# Patient Record
Sex: MALE | Race: WHITE | Employment: UNEMPLOYED | URBAN - METROPOLITAN AREA
[De-identification: names, ages, dates, MRNs, and addresses within clinical notes are randomized per-mention and may not be internally consistent; named-entity substitution may affect disease eponyms.]

---

## 2017-01-09 ENCOUNTER — RECORDS - HEALTHEAST (OUTPATIENT)
Dept: LAB | Facility: CLINIC | Age: 37
End: 2017-01-09

## 2017-01-09 LAB
CREAT SERPL-MCNC: 0.84 MG/DL (ref 0.7–1.3)
GFR SERPL CREATININE-BSD FRML MDRD: >60 ML/MIN/1.73M2

## 2017-01-13 ENCOUNTER — RECORDS - HEALTHEAST (OUTPATIENT)
Dept: LAB | Facility: CLINIC | Age: 37
End: 2017-01-13

## 2017-01-13 LAB
CREAT SERPL-MCNC: 0.96 MG/DL (ref 0.7–1.3)
GFR SERPL CREATININE-BSD FRML MDRD: >60 ML/MIN/1.73M2

## 2017-01-20 ENCOUNTER — RECORDS - HEALTHEAST (OUTPATIENT)
Dept: LAB | Facility: CLINIC | Age: 37
End: 2017-01-20

## 2017-01-23 LAB
CREAT SERPL-MCNC: 0.94 MG/DL (ref 0.7–1.3)
GFR SERPL CREATININE-BSD FRML MDRD: >60 ML/MIN/1.73M2

## 2017-01-24 ENCOUNTER — RECORDS - HEALTHEAST (OUTPATIENT)
Dept: LAB | Facility: CLINIC | Age: 37
End: 2017-01-24

## 2017-01-24 LAB
CREAT SERPL-MCNC: 1.28 MG/DL (ref 0.7–1.3)
GFR SERPL CREATININE-BSD FRML MDRD: >60 ML/MIN/1.73M2

## 2017-02-04 ENCOUNTER — RECORDS - HEALTHEAST (OUTPATIENT)
Dept: LAB | Facility: CLINIC | Age: 37
End: 2017-02-04

## 2017-02-04 LAB
AST SERPL W P-5'-P-CCNC: 42 U/L (ref 0–40)
CREAT SERPL-MCNC: 0.95 MG/DL (ref 0.7–1.3)
GFR SERPL CREATININE-BSD FRML MDRD: >60 ML/MIN/1.73M2

## 2017-02-08 ENCOUNTER — RECORDS - HEALTHEAST (OUTPATIENT)
Dept: LAB | Facility: CLINIC | Age: 37
End: 2017-02-08

## 2017-02-09 LAB
CREAT SERPL-MCNC: 0.9 MG/DL (ref 0.7–1.3)
GFR SERPL CREATININE-BSD FRML MDRD: >60 ML/MIN/1.73M2

## 2017-02-10 ENCOUNTER — RECORDS - HEALTHEAST (OUTPATIENT)
Dept: LAB | Facility: CLINIC | Age: 37
End: 2017-02-10

## 2017-02-10 LAB
CREAT SERPL-MCNC: 0.93 MG/DL (ref 0.7–1.3)
GFR SERPL CREATININE-BSD FRML MDRD: >60 ML/MIN/1.73M2

## 2017-12-07 ENCOUNTER — HOSPITAL ENCOUNTER (EMERGENCY)
Facility: CLINIC | Age: 37
Discharge: HOME | End: 2017-12-07
Attending: EMERGENCY MEDICINE | Admitting: EMERGENCY MEDICINE
Payer: COMMERCIAL

## 2017-12-07 VITALS
DIASTOLIC BLOOD PRESSURE: 85 MMHG | WEIGHT: 175.06 LBS | TEMPERATURE: 98.1 F | RESPIRATION RATE: 16 BRPM | SYSTOLIC BLOOD PRESSURE: 111 MMHG | HEART RATE: 91 BPM | BODY MASS INDEX: 23.74 KG/M2 | OXYGEN SATURATION: 100 %

## 2017-12-07 DIAGNOSIS — Z76.0 ENCOUNTER FOR MEDICATION REFILL: ICD-10-CM

## 2017-12-07 PROCEDURE — 99282 EMERGENCY DEPT VISIT SF MDM: CPT | Mod: Z6 | Performed by: EMERGENCY MEDICINE

## 2017-12-07 PROCEDURE — 99282 EMERGENCY DEPT VISIT SF MDM: CPT | Performed by: EMERGENCY MEDICINE

## 2017-12-07 RX ORDER — OXYCODONE HYDROCHLORIDE 5 MG/1
5 TABLET ORAL EVERY 6 HOURS PRN
Qty: 20 TABLET | Refills: 0 | Status: SHIPPED | OUTPATIENT
Start: 2017-12-07 | End: 2019-03-02

## 2017-12-07 RX ORDER — NAPROXEN 500 MG/1
500 TABLET ORAL 2 TIMES DAILY WITH MEALS
Qty: 14 TABLET | Refills: 0 | Status: SHIPPED | OUTPATIENT
Start: 2017-12-07 | End: 2017-12-14

## 2017-12-07 NOTE — DISCHARGE INSTRUCTIONS
Please note your rash is not contagious.    Please make an appointment to follow up with your primary care at [facility] in the next week for recheck, biopsy results and further treatment plan.

## 2017-12-07 NOTE — ED AVS SNAPSHOT
[facility], Emergency Department    [address]    [place] [address]    Phone:  [phone]    Fax:  [phone]                                       Carlos A Dumont   MRN: [id number]    Department:  [facility], Emergency Department   Date of Visit:  12/7/2017           Patient Information     Date Of Birth          1980        Your diagnoses for this visit were:     Encounter for medication refill        You were seen by Vito Phan MD.        Discharge Instructions       Please note your rash is not contagious.    Please make an appointment to follow up with your primary care at [facility] in the next week for recheck, biopsy results and further treatment plan.    24 Hour Appointment Hotline       To make an appointment at any [facility] clinic, call [phone] ([phone]). If you don't have a family doctor or clinic, we will help you find one. [facility] clinics are conveniently located to serve the needs of you and your family.             Review of your medicines      START taking        Dose / Directions Last dose taken    naproxen 500 MG tablet   Commonly known as:  NAPROSYN   Dose:  500 mg   Quantity:  14 tablet        Take 1 tablet (500 mg) by mouth 2 times daily (with meals) for 7 days   Refills:  0        oxyCODONE IR 5 MG tablet   Commonly known as:  ROXICODONE   Dose:  5 mg   Quantity:  20 tablet        Take 1 tablet (5 mg) by mouth every 6 hours as needed for pain   Refills:  0          Our records show that you are taking the medicines listed below. If these are incorrect, please call your family doctor or clinic.        Dose / Directions Last dose taken    ADDERALL PO   Dose:  10 mg        Take 10 mg by mouth 3 times daily as needed   Refills:  0        GABAPENTIN PO        Take by mouth 2 times daily   Refills:  0        METHADONE HCL PO   Dose:  125 mg        Take 125 mg by mouth daily Liquid form   Refills:  0               "  Prescriptions were sent or printed at these locations (2 Prescriptions)                   Other Prescriptions                Printed at Department/Unit printer (2 of 2)         oxyCODONE IR (ROXICODONE) 5 MG tablet               naproxen (NAPROSYN) 500 MG tablet                Orders Needing Specimen Collection     None      Pending Results     No orders found from 2017 to 2017.            Pending Culture Results     No orders found from 2017 to 2017.            Pending Results Instructions     If you had any lab results that were not finalized at the time of your Discharge, you can call the ED Lab Result RN at [phone]. You will be contacted by this team for any positive Lab results or changes in treatment. The nurses are available 7 days a week from 10A to 6:30P.  You can leave a message 24 hours per day and they will return your call.        Thank you for choosing [facility]       Thank you for choosing [facility] for your care. Our goal is always to provide you with excellent care. Hearing back from our patients is one way we can continue to improve our services. Please take a few minutes to complete the written survey that you may receive in the mail after you visit with us. Thank you!        [employer] Information     [employer] lets you send messages to your doctor, view your test results, renew your prescriptions, schedule appointments and more. To sign up, go to [email or web address] . Click on \"Log in\" on the left side of the screen, which will take you to the Welcome page. Then click on \"Sign up Now\" on the right side of the page.     You will be asked to enter the access code listed below, as well as some personal information. Please follow the directions to create your username and password.     Your access code is: [email or web address]  Expires: 3/7/2018  3:17 PM     Your access code will  in 90 days. If you need help or a new code, please call your [facility] clinic or [phone].      "   Care EveryWhere ID     This is your Care EveryWhere ID. This could be used by other organizations to access your [facility] medical records  [id number]        Equal Access to Services     NURA HILLMAN : Jeremy Chavira, mounika sharma, ifrah villarreal, jose long. So [facility] [phone].    ATENCIÓN: Si habla español, tiene a tatum disposición servicios gratuitos de asistencia lingüística. Llame al [phone].    We comply with applicable federal civil rights laws and [place] laws. We do not discriminate on the basis of race, color, national origin, age, disability, sex, sexual orientation, or gender identity.            After Visit Summary       This is your record. Keep this with you and show to your community pharmacist(s) and doctor(s) at your next visit.

## 2017-12-07 NOTE — ED AVS SNAPSHOT
[facility], [place], Emergency Department    [address]    [facility] [address]    Phone:  [phone]    Fax:  [phone]                                       Carlos A Dumont   MRN: [id number]    Department:  [facility], Emergency Department   Date of Visit:  12/7/2017           After Visit Summary Signature Page     I have received my discharge instructions, and my questions have been answered. I have discussed any challenges I see with this plan with the nurse or doctor.    ..........................................................................................................................................  Patient/Patient Representative Signature      ..........................................................................................................................................  Patient Representative Print Name and Relationship to Patient    ..................................................               ................................................  Date                                            Time    ..........................................................................................................................................  Reviewed by Signature/Title    ...................................................              ..............................................  Date                                                            Time

## 2017-12-07 NOTE — ED PROVIDER NOTES
History     Chief Complaint   Patient presents with     Medication Refill     States he just needs a note to say he is not contagious for the cellulitis of his left chest that is already being treated.  States he is out of pain meds and needs a refill.  Per phone call from staff he is currently ion treatment at [facility].     ROWENA  Carlos A Dumont is a 37 year old male who has been dealing with a rash on his left anterior chest wall treated with antibiotics including minocycline for 28 days in November who recently underwent dermatology and infectious disease consultations at [facility] with recent skin biopsies done.  Patient states he is in the [facility] treatment center program and states he was dropped off here at the ER by one of their vans looking for a note saying that his rash is not contagious and looking for a refill of his pain medicine.  Patient states he is on gabapentin Naprosyn and oxycodone for his pain in his left anterior chest wall related to the rash and infectious process.  This was verified looking in care everywhere and consent was given to look there by the patient.    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 MRSA was not cultured from the biopsy nor was an oncologic process found.    I have reviewed the Medications, Allergies, Past Medical and Surgical History, and Social History in the [employer] system.    Past Medical History:   Diagnosis Date     ADHD (attention deficit hyperactivity disorder)      Hep C w/o coma, chronic (H)      Infected blister of chest wall, left, initial encounter     States he got MRSA in his blood and got an infection in his left chest.  Infection in left ribs.     Narcolepsy      Previous Medications    AMPHETAMINE-DEXTROAMPHETAMINE (ADDERALL PO)    Take 10 mg by mouth 3 times daily as needed    GABAPENTIN PO    Take by mouth 2 times daily     METHADONE HCL PO    Take 125 mg by mouth daily Liquid form    a more extensive list can be found in care everywhere at [facility]      Allergies   Allergen Reactions     Modafinil Nausea     Social History     Social History     Marital status: Single     Spouse name: N/A     Number of children: N/A     Years of education: N/A     Occupational History     Not on file.     Social History Main Topics     Smoking status: Current Every Day Smoker     Packs/day: 1.00     Smokeless tobacco: Never Used     Alcohol use No     Drug use: No     Sexual activity: Not on file     Other Topics Concern     Not on file     Social History Narrative     History reviewed. No pertinent surgical history.  No family history on file.    Review of Systems   All other systems reviewed and are negative.      Physical Exam   BP: 122/67  Pulse: 76  Temp: 98  F (36.7  C)  Resp: 16  Weight: 79.4 kg (175 lb 1 oz)  SpO2: 98 %      Physical Exam   Constitutional: He is oriented to person, place, and time.   Alert conversant pleasant and ambulatory without difficulty   HENT:   Head: Atraumatic.   Eyes: EOM are normal. Pupils are equal, round, and reactive to light.   Neck: Neck supple.   Cardiovascular: Normal heart sounds.    Pulmonary/Chest: Breath sounds normal.   Abdominal: Soft.   Musculoskeletal: He exhibits no deformity.   Neurological: He is alert and oriented to person, place, and time.   Grossly intact and symmetric   Skin: Rash (dark patch of skin left anterior chest consistent with chronic resolving cellulitis or other dermatologic process) noted.   Psychiatric: He has a normal mood and affect.       ED Course     ED Course     Procedures          At this point I don't think any labs need to be done here in the ER.  The patient had a negative VDRL done at [facility] in 2010 which may need to be repeated.  No Lyme's titer was done that I can find but in the either case the patient had been treated with 28 days of minocycline  already.    I attempted to discuss the case with [facility] however they seemed resistant to talk with me about the patient's case and their need for medical information.      Assessments & Plan (with Medical Decision Making)     I have reviewed the nursing notes.    I have reviewed the findings, diagnosis, plan and need for follow up with the patient.    New Prescriptions    NAPROXEN (NAPROSYN) 500 MG TABLET    Take 1 tablet (500 mg) by mouth 2 times daily (with meals) for 7 days    OXYCODONE IR (ROXICODONE) 5 MG TABLET    Take 1 tablet (5 mg) by mouth every 6 hours as needed for pain    continue gabapentin        Final diagnoses:   Encounter for medication refill       Please note your rash is not contagious.     Please make an appointment to follow up with your primary care at [facility] in the next week for recheck, biopsy results and further treatment plan.        Vito Phan MD    12/7/2017   [facility], [place], EMERGENCY DEPARTMENT     Vito Phan MD  12/07/17 2365

## 2017-12-07 NOTE — ED NOTES
Bed: IN01  Expected date: 12/7/17  Expected time: 12:02 PM  Means of arrival: Car  Comments:  Carlos A Dumont [birth year]  Recent MRSA infection, not improving, coming from [facility]

## 2019-01-24 ENCOUNTER — RECORDS - HEALTHEAST (OUTPATIENT)
Dept: ADMINISTRATIVE | Facility: OTHER | Age: 39
End: 2019-01-24

## 2019-03-02 ENCOUNTER — HOSPITAL ENCOUNTER (EMERGENCY)
Facility: CLINIC | Age: 39
Discharge: HOME | End: 2019-03-02
Attending: EMERGENCY MEDICINE | Admitting: EMERGENCY MEDICINE
Payer: COMMERCIAL

## 2019-03-02 ENCOUNTER — APPOINTMENT (OUTPATIENT)
Dept: GENERAL RADIOLOGY | Facility: CLINIC | Age: 39
End: 2019-03-02
Attending: EMERGENCY MEDICINE
Payer: COMMERCIAL

## 2019-03-02 ENCOUNTER — APPOINTMENT (OUTPATIENT)
Dept: ULTRASOUND IMAGING | Facility: CLINIC | Age: 39
End: 2019-03-02
Attending: EMERGENCY MEDICINE
Payer: COMMERCIAL

## 2019-03-02 VITALS
DIASTOLIC BLOOD PRESSURE: 65 MMHG | TEMPERATURE: 97 F | HEART RATE: 77 BPM | BODY MASS INDEX: 23.19 KG/M2 | WEIGHT: 171 LBS | RESPIRATION RATE: 16 BRPM | SYSTOLIC BLOOD PRESSURE: 124 MMHG | OXYGEN SATURATION: 96 %

## 2019-03-02 DIAGNOSIS — R07.89 OTHER CHEST PAIN: ICD-10-CM

## 2019-03-02 DIAGNOSIS — R74.8 ELEVATED LIVER ENZYMES: ICD-10-CM

## 2019-03-02 DIAGNOSIS — R42 INTERMITTENT LIGHTHEADEDNESS: ICD-10-CM

## 2019-03-02 DIAGNOSIS — F17.210 CIGARETTE SMOKER: ICD-10-CM

## 2019-03-02 LAB
ALBUMIN SERPL-MCNC: 3.6 G/DL (ref 3.4–5)
ALP SERPL-CCNC: 397 U/L (ref 40–150)
ALT SERPL W P-5'-P-CCNC: 585 U/L (ref 0–70)
ANION GAP SERPL CALCULATED.3IONS-SCNC: 6 MMOL/L (ref 3–14)
APAP SERPL-MCNC: 3 MG/L (ref 10–20)
AST SERPL W P-5'-P-CCNC: 426 U/L (ref 0–45)
BASOPHILS # BLD AUTO: 0 10E9/L (ref 0–0.2)
BASOPHILS NFR BLD AUTO: 0.6 %
BILIRUB SERPL-MCNC: 0.8 MG/DL (ref 0.2–1.3)
BUN SERPL-MCNC: 22 MG/DL (ref 7–30)
CALCIUM SERPL-MCNC: 8.7 MG/DL (ref 8.5–10.1)
CHLORIDE SERPL-SCNC: 103 MMOL/L (ref 94–109)
CO2 SERPL-SCNC: 30 MMOL/L (ref 20–32)
CREAT SERPL-MCNC: 0.9 MG/DL (ref 0.66–1.25)
D DIMER PPP FEU-MCNC: 0.4 UG/ML FEU (ref 0–0.5)
DIFFERENTIAL METHOD BLD: ABNORMAL
EOSINOPHIL # BLD AUTO: 0.1 10E9/L (ref 0–0.7)
EOSINOPHIL NFR BLD AUTO: 2.9 %
ERYTHROCYTE [DISTWIDTH] IN BLOOD BY AUTOMATED COUNT: 13.9 % (ref 10–15)
FERRITIN SERPL-MCNC: 148 NG/ML (ref 26–388)
GFR SERPL CREATININE-BSD FRML MDRD: >90 ML/MIN/{1.73_M2}
GLUCOSE SERPL-MCNC: 99 MG/DL (ref 70–99)
HCT VFR BLD AUTO: 40.2 % (ref 40–53)
HGB BLD-MCNC: 13.2 G/DL (ref 13.3–17.7)
IMM GRANULOCYTES # BLD: 0 10E9/L (ref 0–0.4)
IMM GRANULOCYTES NFR BLD: 0 %
IRON SATN MFR SERPL: 40 % (ref 15–46)
IRON SERPL-MCNC: 121 UG/DL (ref 35–180)
LYMPHOCYTES # BLD AUTO: 2.8 10E9/L (ref 0.8–5.3)
LYMPHOCYTES NFR BLD AUTO: 57.6 %
MCH RBC QN AUTO: 28 PG (ref 26.5–33)
MCHC RBC AUTO-ENTMCNC: 32.8 G/DL (ref 31.5–36.5)
MCV RBC AUTO: 85 FL (ref 78–100)
MONOCYTES # BLD AUTO: 0.5 10E9/L (ref 0–1.3)
MONOCYTES NFR BLD AUTO: 9.9 %
NEUTROPHILS # BLD AUTO: 1.4 10E9/L (ref 1.6–8.3)
NEUTROPHILS NFR BLD AUTO: 29 %
NRBC # BLD AUTO: 0 10*3/UL
NRBC BLD AUTO-RTO: 0 /100
PLATELET # BLD AUTO: 183 10E9/L (ref 150–450)
POTASSIUM SERPL-SCNC: 3.9 MMOL/L (ref 3.4–5.3)
PROT SERPL-MCNC: 7.4 G/DL (ref 6.8–8.8)
RBC # BLD AUTO: 4.72 10E12/L (ref 4.4–5.9)
SODIUM SERPL-SCNC: 139 MMOL/L (ref 133–144)
TIBC SERPL-MCNC: 300 UG/DL (ref 240–430)
TROPONIN I SERPL-MCNC: <0.015 UG/L (ref 0–0.04)
WBC # BLD AUTO: 4.9 10E9/L (ref 4–11)

## 2019-03-02 PROCEDURE — 99285 EMERGENCY DEPT VISIT HI MDM: CPT | Mod: 25

## 2019-03-02 PROCEDURE — 83550 IRON BINDING TEST: CPT | Performed by: EMERGENCY MEDICINE

## 2019-03-02 PROCEDURE — 86706 HEP B SURFACE ANTIBODY: CPT | Performed by: EMERGENCY MEDICINE

## 2019-03-02 PROCEDURE — 86803 HEPATITIS C AB TEST: CPT | Performed by: EMERGENCY MEDICINE

## 2019-03-02 PROCEDURE — 82728 ASSAY OF FERRITIN: CPT | Performed by: EMERGENCY MEDICINE

## 2019-03-02 PROCEDURE — 85025 COMPLETE CBC W/AUTO DIFF WBC: CPT | Performed by: EMERGENCY MEDICINE

## 2019-03-02 PROCEDURE — 76705 ECHO EXAM OF ABDOMEN: CPT

## 2019-03-02 PROCEDURE — 84484 ASSAY OF TROPONIN QUANT: CPT | Performed by: EMERGENCY MEDICINE

## 2019-03-02 PROCEDURE — 93005 ELECTROCARDIOGRAM TRACING: CPT

## 2019-03-02 PROCEDURE — 80329 ANALGESICS NON-OPIOID 1 OR 2: CPT | Performed by: EMERGENCY MEDICINE

## 2019-03-02 PROCEDURE — 80053 COMPREHEN METABOLIC PANEL: CPT | Performed by: EMERGENCY MEDICINE

## 2019-03-02 PROCEDURE — 85379 FIBRIN DEGRADATION QUANT: CPT | Performed by: EMERGENCY MEDICINE

## 2019-03-02 PROCEDURE — 99285 EMERGENCY DEPT VISIT HI MDM: CPT | Mod: 25 | Performed by: EMERGENCY MEDICINE

## 2019-03-02 PROCEDURE — 93010 ELECTROCARDIOGRAM REPORT: CPT | Mod: Z6 | Performed by: EMERGENCY MEDICINE

## 2019-03-02 PROCEDURE — 71046 X-RAY EXAM CHEST 2 VIEWS: CPT

## 2019-03-02 PROCEDURE — 83540 ASSAY OF IRON: CPT | Performed by: EMERGENCY MEDICINE

## 2019-03-02 ASSESSMENT — ENCOUNTER SYMPTOMS
NAUSEA: 1
LIGHT-HEADEDNESS: 1
VOMITING: 0
WEAKNESS: 0
DIFFICULTY URINATING: 1
DYSURIA: 0
HEMATURIA: 0
DIZZINESS: 1
DIARRHEA: 0
HEADACHES: 1
NUMBNESS: 0
BACK PAIN: 0
ABDOMINAL PAIN: 0
COUGH: 1
FEVER: 0
SHORTNESS OF BREATH: 1

## 2019-03-02 NOTE — ED PROVIDER NOTES
"  History     Chief Complaint   Patient presents with     Dizziness     has had dizziness, lightheaded, cough \"for years\", \"my friend made me come in \"     HPI  Carlos A Dumont is a 38 year old male with a history of chemical dependency (on methadone), chronic hepatitis C, anxiety and narcolepsy who presents to the Emergency Department with several symptoms described below. He presents with his friend.    The patient reports an  electric shock  sensation, cough, chest pain, shortness of breath, dizziness (lightheadedness) and twitching with deep breaths. He also reports intermittent blurriness of his vision, and tension in his head with each episode. He states the blurry vision is intermittent throughout the day. He reports these symptoms over the past couple years intermittently. He states it is worsening, and notes it used to last a couple hours but today it has lasted all day. He thinks it may be triggered by a mold allergy or states it could be because of his medications (he is on hydroxyzine, methadone, oxybutynin and gabapentin). He notes that he gets  some sort of flutter like I need an inhaler  when he drinks water.    He also reports headaches and notes he s tried taking Tylenol.  The patient reports some nausea but denies any vomiting. He reports urinary retention sometimes but attributes it to oxybutynin. He also denies abdominal pain, diarrhea, dysuria, fever, weakness, any numbness or tingling and denies back pain. He denies recent syncope. He denies history of diabetes. The patient would like medication that helps with anxiety, and believes antidepressants may cause the electrical shock symptoms. The patient notes he was hospitalized 1.5 years ago due to endocarditis. He also notes being anticoagulated in the past due to DVT; he currently is not anticoagulated. He is not aware of any other trigger for the symptoms.     I have reviewed the Medications, Allergies, Past Medical and Surgical History, " and Social History in the [employer] system.  Past Medical History:   Diagnosis Date     ADHD (attention deficit hyperactivity disorder)      Hep C w/o coma, chronic (H)      Infected blister of chest wall, left, initial encounter     States he got MRSA in his blood and got an infection in his left chest.  Infection in left ribs.     Narcolepsy        History reviewed. No pertinent surgical history.    History reviewed. No pertinent family history.    Social History     Tobacco Use     Smoking status: Current Every Day Smoker     Packs/day: 1.00     Smokeless tobacco: Never Used   Substance Use Topics     Alcohol use: No       No current facility-administered medications for this encounter.      Current Outpatient Medications   Medication     GABAPENTIN PO     HYDROXYZINE HCL PO     METHADONE HCL PO     OXYBUTYNIN TD        Allergies   Allergen Reactions     Modafinil Nausea       Review of Systems   Constitutional: Negative for fever.   Eyes: Positive for visual disturbance (blurry).   Respiratory: Positive for cough and shortness of breath.    Cardiovascular: Positive for chest pain.   Gastrointestinal: Positive for nausea. Negative for abdominal pain, diarrhea and vomiting.   Genitourinary: Positive for difficulty urinating. Negative for dysuria and hematuria.   Musculoskeletal: Negative for back pain.   Neurological: Positive for dizziness, light-headedness and headaches. Negative for syncope, weakness and numbness.   All other systems reviewed and are negative.      Physical Exam   BP: 124/65  Pulse: 77  Temp: 97  F (36.1  C)  Resp: 16  Weight: 77.6 kg (171 lb)  SpO2: 96 %      Physical Exam  GEN:  Well developed, no acute distress  HEENT:  PERRL, EOMI, Mucous membranes are moist.   Cardio:  RRR, no murmur, radial pulses equal bilaterally  PULM:  Lungs clear, good air movement, no wheezes, rales   Abd:  Soft, normal bowel sounds, no focal tenderness  Musculoskeletal:  normal range of motion, no lower extremity  swelling or calf tenderness  Neuro:  Alert and oriented X3, Follows commands, CN exam is normal, finger to nose is normal, sensation and strength is normal throughout, no pronator drift   Skin:  Warm, dry      ED Course        Procedures             EKG Interpretation:      Interpreted by Aida Hays  Time reviewed: 15:52  Symptoms at time of EKG: several symptoms, including chest pain   Rhythm: normal sinus   Rate: normal  Axis: normal  Ectopy: none  Conduction: normal  ST Segments/ T Waves: No ST-T wave changes  Q Waves: none  Comparison to prior: No old EKG available    Clinical Impression: normal EKG        Critical Care time:  none  Labs are normal except as shown.  Patient's LFTs are elevated.  I reviewed the previous electronic medical records and see that he has had elevated liver enzymes in the past.  Patient tells me that at one point he was diagnosed with hepatitis C, however recently, was told that he no longer has it.  Patient is not having any abdominal pain, no tenderness on exam.  Given the elevated LFTs, hepatitis B surface antibody and hepatitis C antibody testing was ordered as well as iron studies.  Acetaminophen level is negative.    Chest x-ray was reviewed by me and results are shown below.  Right upper quadrant ultrasound was done results are also shown below.    Results for orders placed or performed during the hospital encounter of 03/02/19   XR Chest 2 Views    Narrative    CHEST TWO VIEWS  3/2/2019 5:24 PM     HISTORY: Chest pain.     COMPARISON: 9/21/2013.     FINDINGS: Cardiomediastinal silhouette within normal limits. No focal  airspace opacities. No pleural effusion. No pneumothorax identified.  The bones are unremarkable.       Impression    IMPRESSION: No acute cardiopulmonary abnormalities.      GRANT CABRAL MD   US Abdomen Limited    Narrative    RIGHT UPPER QUADRANT ULTRASOUND 3/2/2019 6:43 PM    HISTORY:  Abnormal liver function tests    COMPARISON:  None.    FINDINGS:    Hypoechoic nodule consistent with periportal adenopathy 3.6 x 2.4 x  2.8 cm in size with adjacent smaller periportal nodes identified.    Pancreas: Normal where visualized.    Liver: Enlarged, 21 cm in length. Normal echogenicity. No focal  lesions demonstrated.    Biliary: Borderline prominent extrahepatic ducts 0.64 cm. No  intrahepatic duct dilatation demonstrated.    Gallbladder: No gallstones, no gallbladder wall thickening. Negative  sonographic Mead sign.    Right kidney: Normal in appearance 11 cm in length.      Impression    IMPRESSION: Nonspecific mildly enlarged eddie hepatis node. Right lobe  of the liver 21 cm in length and prominent. Extrahepatic ducts  borderline prominent. No gallstones.    SCOUT GREEN MD          Labs Ordered and Resulted from Time of ED Arrival Up to the Time of Departure from the ED   CBC WITH PLATELETS DIFFERENTIAL - Abnormal; Notable for the following components:       Result Value    Hemoglobin 13.2 (*)     Absolute Neutrophil 1.4 (*)     All other components within normal limits   COMPREHENSIVE METABOLIC PANEL - Abnormal; Notable for the following components:    Alkaline Phosphatase 397 (*)      (*)      (*)     All other components within normal limits   TROPONIN I   D DIMER QUANTITATIVE   ACETAMINOPHEN LEVEL   HEPATITIS B SURFACE ANTIBODY   IRON AND IRON BINDING CAPACITY   FERRITIN   HEPATITIS C ANTIBODY   ROUTINE UA WITH MICROSCOPIC REFLEX TO CULTURE           Assessments & Plan (with Medical Decision Making)   Patient presents with several symptoms that are intermittent in nature and have been present for the last year or more.  These are described above.  Patient symptoms are not suggestive of acute coronary syndrome, PE, aortic dissection, acute infection, seizure, syncope.  Labs reveal elevated liver function enzymes, of unclear etiology at this point.  Was discussed with the patient.  Findings on the ultrasound were discussed  with the patient as well.  Since he has had elevated liver enzymes in the past, is hemodynamically stable, has nonspecific ultrasound findings, I think he can continue his workup as an outpatient.  Patient is agreeable to this.  He was advised to follow-up with his primary care provider and recheck his liver enzymes by on Monday, 2 days from now.  He was also advised to continue workup for his nonspecific symptoms including intermittent lightheadedness, blurry vision, chest pain, shortness of breath, shock like sensations with his primary care physician.  He is advised to return the emergency department if he has new or worsening symptoms or other concerns.    I have reviewed the nursing notes.    I have reviewed the findings, diagnosis, plan and need for follow up with the patient.       Medication List      There are no discharge medications for this visit.         Final diagnoses:   Intermittent lightheadedness   Other chest pain   Elevated liver enzymes     INaomy, am serving as a trained medical scribe to document services personally performed by Aida Hays MD, based on the provider's statements to me.   IAida MD, was physically present and have reviewed and verified the accuracy of this note documented by Naomy Eid.    3/2/2019   [facility], [place], EMERGENCY DEPARTMENT     Aida Hays MD  03/02/19 9273

## 2019-03-02 NOTE — ED AVS SNAPSHOT
[facility], [place], Emergency Department  [address]  [facility] [address]  Phone:  [phone]  Fax:  [phone]                                    Carlos A Dumont   MRN: [id number]    Department:  [facility], Emergency Department   Date of Visit:  3/2/2019           After Visit Summary Signature Page    I have received my discharge instructions, and my questions have been answered. I have discussed any challenges I see with this plan with the nurse or doctor.    ..........................................................................................................................................  Patient/Patient Representative Signature      ..........................................................................................................................................  Patient Representative Print Name and Relationship to Patient    ..................................................               ................................................  Date                                   Time    ..........................................................................................................................................  Reviewed by Signature/Title    ...................................................              ..............................................  Date                                               Time          22EPIC Rev 08/18

## 2019-03-03 LAB — INTERPRETATION ECG - MUSE: NORMAL

## 2019-03-03 NOTE — DISCHARGE INSTRUCTIONS
Please make an appointment to follow up with Your Primary Care Provider in 2 days to recheck liver enzymes.

## 2019-03-04 LAB
HBV SURFACE AB SERPL IA-ACNC: 0 M[IU]/ML
HCV AB SERPL QL IA: REACTIVE

## 2022-03-22 ENCOUNTER — TRANSFERRED RECORDS (OUTPATIENT)
Dept: HEALTH INFORMATION MANAGEMENT | Facility: CLINIC | Age: 42
End: 2022-03-22